# Patient Record
Sex: FEMALE | URBAN - METROPOLITAN AREA
[De-identification: names, ages, dates, MRNs, and addresses within clinical notes are randomized per-mention and may not be internally consistent; named-entity substitution may affect disease eponyms.]

---

## 2023-08-18 ENCOUNTER — APPOINTMENT (RX ONLY)
Dept: URBAN - METROPOLITAN AREA CLINIC 158 | Facility: CLINIC | Age: 73
Setting detail: DERMATOLOGY
End: 2023-08-18

## 2023-08-18 VITALS — HEIGHT: 59 IN | WEIGHT: 112 LBS

## 2023-08-18 DIAGNOSIS — L28.1 PRURIGO NODULARIS: ICD-10-CM | Status: INADEQUATELY CONTROLLED

## 2023-08-18 DIAGNOSIS — L20.89 OTHER ATOPIC DERMATITIS: ICD-10-CM | Status: INADEQUATELY CONTROLLED

## 2023-08-18 DIAGNOSIS — L66.12 FRONTAL FIBROSING ALOPECIA: ICD-10-CM | Status: INADEQUATELY CONTROLLED

## 2023-08-18 PROBLEM — L66.8 OTHER CICATRICIAL ALOPECIA: Status: ACTIVE | Noted: 2023-08-18

## 2023-08-18 PROCEDURE — ? TREATMENT REGIMEN

## 2023-08-18 PROCEDURE — ? PRESCRIPTION

## 2023-08-18 PROCEDURE — ? COUNSELING

## 2023-08-18 PROCEDURE — 99204 OFFICE O/P NEW MOD 45 MIN: CPT

## 2023-08-18 RX ORDER — CLOBETASOL PROPIONATE 0.5 MG/ML
APPLY SOLUTION TOPICAL QHS
Qty: 50 | Refills: 1 | Status: ERX | COMMUNITY
Start: 2023-08-18

## 2023-08-18 RX ORDER — FEXOFENADINE HYDROCHLORIDE 180 MG/1
1 TABLET ORAL QHS
Qty: 30 | Refills: 1 | Status: ERX | COMMUNITY
Start: 2023-08-18

## 2023-08-18 RX ORDER — FINASTERIDE 5 MG/1
1 TABLET, FILM COATED ORAL QD
Qty: 30 | Refills: 1 | Status: ERX | COMMUNITY
Start: 2023-08-18

## 2023-08-18 RX ORDER — FLUOCINONIDE 0.5 MG/G
APPLY CREAM TOPICAL BID
Qty: 120 | Refills: 1 | Status: ERX | COMMUNITY
Start: 2023-08-18

## 2023-08-18 RX ORDER — CETIRIZINE HCL 10 MG
1 CAPSULE ORAL QAM
Qty: 30 | Refills: 1 | Status: ERX | COMMUNITY
Start: 2023-08-18

## 2023-08-18 RX ADMIN — CLOBETASOL PROPIONATE APPLY: 0.5 SOLUTION TOPICAL at 00:00

## 2023-08-18 RX ADMIN — FEXOFENADINE HYDROCHLORIDE 1: 180 TABLET ORAL at 00:00

## 2023-08-18 RX ADMIN — FINASTERIDE 1: 5 TABLET, FILM COATED ORAL at 00:00

## 2023-08-18 RX ADMIN — FLUOCINONIDE APPLY: 0.5 CREAM TOPICAL at 00:00

## 2023-08-18 RX ADMIN — Medication 1: at 00:00

## 2023-08-18 ASSESSMENT — LOCATION ZONE DERM
LOCATION ZONE: FACE
LOCATION ZONE: ARM
LOCATION ZONE: SCALP

## 2023-08-18 ASSESSMENT — LOCATION SIMPLE DESCRIPTION DERM
LOCATION SIMPLE: RIGHT FOREARM
LOCATION SIMPLE: RIGHT SCALP
LOCATION SIMPLE: SCALP
LOCATION SIMPLE: RIGHT WRIST
LOCATION SIMPLE: SUPERIOR FOREHEAD

## 2023-08-18 ASSESSMENT — LOCATION DETAILED DESCRIPTION DERM
LOCATION DETAILED: LEFT SUPERIOR PARIETAL SCALP
LOCATION DETAILED: RIGHT VENTRAL DISTAL FOREARM
LOCATION DETAILED: RIGHT VENTRAL WRIST
LOCATION DETAILED: SUPERIOR MID FOREHEAD
LOCATION DETAILED: RIGHT MEDIAL FRONTAL SCALP

## 2023-08-18 NOTE — PROCEDURE: TREATMENT REGIMEN
Detail Level: Zone
Initiate Treatment: clobetasol 0.05 % scalp solution QHS\\nfinasteride 5 mg tablet QD
Initiate Treatment: fexofenadine 180 mg tablet QHS\\ncetirizine 10 mg capsule QAM
Initiate Treatment: fluocinonide 0.05 % topical cream BID

## 2023-11-01 ENCOUNTER — APPOINTMENT (RX ONLY)
Dept: URBAN - METROPOLITAN AREA CLINIC 158 | Facility: CLINIC | Age: 73
Setting detail: DERMATOLOGY
End: 2023-11-01

## 2023-11-01 VITALS — WEIGHT: 112 LBS | HEIGHT: 59 IN

## 2023-11-01 DIAGNOSIS — L20.89 OTHER ATOPIC DERMATITIS: ICD-10-CM

## 2023-11-01 DIAGNOSIS — L66.12 FRONTAL FIBROSING ALOPECIA: ICD-10-CM

## 2023-11-01 DIAGNOSIS — L28.1 PRURIGO NODULARIS: ICD-10-CM

## 2023-11-01 PROBLEM — L66.8 OTHER CICATRICIAL ALOPECIA: Status: ACTIVE | Noted: 2023-11-01

## 2023-11-01 PROCEDURE — ? TREATMENT REGIMEN

## 2023-11-01 PROCEDURE — 99213 OFFICE O/P EST LOW 20 MIN: CPT

## 2023-11-01 PROCEDURE — ? COUNSELING

## 2023-11-01 PROCEDURE — ? PRESCRIPTION

## 2023-11-01 RX ORDER — FINASTERIDE 5 MG/1
1 TABLET, FILM COATED ORAL
Qty: 30 | Refills: 2 | Status: ERX

## 2023-11-01 RX ORDER — CLOBETASOL PROPIONATE 0.5 MG/ML
APPLY SOLUTION TOPICAL QHS
Qty: 50 | Refills: 2 | Status: ERX

## 2023-11-01 ASSESSMENT — LOCATION SIMPLE DESCRIPTION DERM
LOCATION SIMPLE: RIGHT SCALP
LOCATION SIMPLE: LEFT FOREHEAD
LOCATION SIMPLE: SCALP
LOCATION SIMPLE: RIGHT WRIST
LOCATION SIMPLE: RIGHT FOREARM

## 2023-11-01 ASSESSMENT — LOCATION ZONE DERM
LOCATION ZONE: SCALP
LOCATION ZONE: ARM
LOCATION ZONE: FACE

## 2023-11-01 ASSESSMENT — LOCATION DETAILED DESCRIPTION DERM
LOCATION DETAILED: RIGHT MEDIAL FRONTAL SCALP
LOCATION DETAILED: LEFT SUPERIOR PARIETAL SCALP
LOCATION DETAILED: LEFT SUPERIOR FOREHEAD
LOCATION DETAILED: RIGHT VENTRAL WRIST
LOCATION DETAILED: RIGHT VENTRAL DISTAL FOREARM

## 2023-11-01 NOTE — PROCEDURE: TREATMENT REGIMEN
Detail Level: Zone
Plan: Rash is clear today
Continue Regimen: fluocinonide 0.05 % topical cream BID prn
Continue Regimen: fexofenadine 180 mg tablet QHS prn \\ncetirizine 10 mg capsule QAM prn
Continue Regimen: finasteride 5 mg tablet po once daily\\nclobetasol 0.05 % scalp solution QHS
Plan: Pt does have some new growth. Discussed with pt that some of the scalp is likely scarred and will not regrow. We can do a biopsy if needed. Will continue Finasteride and Clobetasol for now